# Patient Record
Sex: FEMALE | Race: WHITE | NOT HISPANIC OR LATINO | URBAN - METROPOLITAN AREA
[De-identification: names, ages, dates, MRNs, and addresses within clinical notes are randomized per-mention and may not be internally consistent; named-entity substitution may affect disease eponyms.]

---

## 2018-12-07 NOTE — PATIENT DISCUSSION
JEN with patient to see PCP right away for a med clearance letter to us after a full workup with blood work. WLS concerned BP or DM.   consult with Dr Thurmon Hammans for heme OS next avail.

## 2018-12-07 NOTE — PATIENT DISCUSSION
The patient feels that the cataract is significantly impacting daily activities and has elected cataract surgery. The risks, benefits, and alternatives to surgery were discussed. The patient elects to proceed with surgery. needs clearance letter and consult with Retina prior to next visit with WLS.   no SCL prior to next follow up with [facility] for SO in clinic and goal with RH.

## 2018-12-07 NOTE — PATIENT DISCUSSION
patient to remove scl 2 weeks prior to next visit with WLS after PCP and retina consult with clearance from both drs .

## 2019-01-17 NOTE — PATIENT DISCUSSION
Intraretinal heme along superior arcade. No CME. Hx of slightly elevated lipids. Monitor for now, will consider Hypercoag workup in the future.

## 2019-03-13 NOTE — PATIENT DISCUSSION
The patient feels that the cataract is significantly impacting daily activities and has elected cataract surgery. The risks, benefits, and alternatives to surgery were discussed. The patient elects to proceed with surgery. Patient elects PCIOL OD goal charles CV needs SOs.

## 2019-04-04 NOTE — PATIENT DISCUSSION
Patient advised of the right to post-operative care by the surgeon. Patient is fully informed of, and agreed to, co-management with their primary optometric physician. Post-operative care by the surgeon is not medically necessary and co-management is clinically appropriate. Patient has received itemization of fees related to cataract surgery. Transfer of care letter completed for the patient. Transfer care of left eye to Dr. Norman Lopez on 04/04/19. Patient instructed to call immediately if any new distortion, blurring, decreased vision or eye pain.

## 2019-04-11 NOTE — PATIENT DISCUSSION
Patient advised of the right to post-operative care by the surgeon. Patient is fully informed of, and agreed to, co-management with their primary optometric physician. Post-operative care by the surgeon is not medically necessary and co-management is clinically appropriate. Patient has received itemization of fees related to cataract surgery. Transfer of care letter completed for the patient. Transfer care of RIGHT eye to Dr. Jed Hernandez on 4/11/19. Patient instructed to call immediately if any new distortion, blurring, decreased vision or eye pain.

## 2019-06-27 NOTE — PATIENT DISCUSSION
Patient advised of the right to post-operative care by the surgeon. Patient is fully informed of, and agreed to, co-management with their primary optometric physician. Post-operative care by the surgeon is not medically necessary and co-management is clinically appropriate. Patient has received itemization of fees related to cataract surgery. Transfer of care letter completed for the patient. Transfer care of RIGHT eye to Dr. Lobo Hilario on 4/11/19. Patient instructed to call immediately if any new distortion, blurring, decreased vision or eye pain.

## 2019-10-10 NOTE — PATIENT DISCUSSION
Patient advised of the right to post-operative care by the surgeon. Patient is fully informed of, and agreed to, co-management with their primary optometric physician. Post-operative care by the surgeon is not medically necessary and co-management is clinically appropriate. Patient has received itemization of fees related to cataract surgery. Transfer of care letter completed for the patient. Transfer care of RIGHT eye to Dr. Blanca Ruiz on 4/11/19. Patient instructed to call immediately if any new distortion, blurring, decreased vision or eye pain.

## 2019-10-10 NOTE — PROCEDURE NOTE: SURGICAL
<p>Prior to commencing surgery patient identification, surgical procedure, site, and side were confirmed by Dr. Matthews.&nbsp; Following topical proparacaine anesthesia, the patient was positioned at the YAG laser, a contact lens coupled to the cornea of the right eye with methylcellulose and an axial posterior capsulotomy performed without complication using 3.1 Mj x 16. Attention was then turned to the left eye and a contact lens coupled to the cornea of the left eye with methylcellulose and an axial posterior capsulotomy performed without complication using 2.9 Mj x 22. One drop of Alphagan was instilled in both eyes and the patient returned to the holding area having tolerated the procedure well and without complication. </p><p>MRN:[id number]</p>

## 2019-10-10 NOTE — PATIENT DISCUSSION
Patient advised of the right to post-operative care by the surgeon. Patient is fully informed of, and agreed to, co-management with their primary optometric physician. Post-operative care by the surgeon is not medically necessary and co-management is clinically appropriate. Patient has received itemization of fees related to cataract surgery. Transfer of care letter completed for the patient. Transfer care of RIGHT eye to Dr. Jesse Barrera on 4/11/19. Patient instructed to call immediately if any new distortion, blurring, decreased vision or eye pain.

## 2019-11-05 ENCOUNTER — IMPORTED ENCOUNTER (OUTPATIENT)
Dept: URBAN - METROPOLITAN AREA CLINIC 31 | Facility: CLINIC | Age: 29
End: 2019-11-05

## 2019-11-05 PROCEDURE — 99243 OFF/OP CNSLTJ NEW/EST LOW 30: CPT

## 2019-11-05 NOTE — PATIENT DISCUSSION
Benign episodic Pupillary mydryasis - doscussed with the patient and her mother in depth.suggested observation. Had CT head yesterday that was reportedly normal.Some tenderness with eye movements - nonspecific given normal exam. Observe. Call ASAP if any worsening or any additional symptoms - discussed.

## 2022-04-02 ASSESSMENT — VISUAL ACUITY
OD_SC: J1+
OD_CC: 20/20
OS_SC: J1+
OS_CC: 20/20-2